# Patient Record
Sex: FEMALE | Race: WHITE | NOT HISPANIC OR LATINO | Employment: FULL TIME | URBAN - METROPOLITAN AREA
[De-identification: names, ages, dates, MRNs, and addresses within clinical notes are randomized per-mention and may not be internally consistent; named-entity substitution may affect disease eponyms.]

---

## 2023-12-28 ENCOUNTER — APPOINTMENT (OUTPATIENT)
Dept: OPHTHALMOLOGY | Facility: CLINIC | Age: 58
End: 2023-12-28
Payer: COMMERCIAL

## 2024-01-08 NOTE — PROGRESS NOTES
Assessment and Plan    Lab Results   Component Value Date/Time    SEDRATE <1 02/15/2018 1606      This 58 year-old woman with a history of glaucoma suspect, hypothyroidism, IBS, diverticulosis presents for evaluation of nystagmus.    She has nystagmus on my examination, but appears asymptomatic. She also has a small eso deviation that could reflect mild cranial nerve (CN) VI palsy or palsies, but also has sagging eye in the differential diagnosis. She does have hearing loss. My broader differential diagnosis includes brainstem lesions broadly including inflammatory, demyelinating and mass etiologies. I also consider nutritional causes possible although without a clearly concerning history outside IBS & diverticulosis. Paraneoplastic disease seems unlikely given long duration of months without sequelae, but will be a consideration if testing does not reveal a cause.    She also is a glaucoma suspect based on cup to disc ratio and borderline intraocular pressure elevation. I recommend glaucoma consultation. Imaging and laboratory testing will be helpful in considering usual differential diagnosis.    Plan    Check MRI IAC with contrast.  Check magnesium, vitamin B1, B12 & folate. Also check ACE, syphilis & tuberculosis.  Referral to Dr. Aasef Shaikh.    Follow up in 3-4 weeks with stereo plates, HVF & OCT. (Dilated 1/9/2024)

## 2024-01-09 ENCOUNTER — LAB (OUTPATIENT)
Dept: LAB | Facility: LAB | Age: 59
End: 2024-01-09
Payer: COMMERCIAL

## 2024-01-09 ENCOUNTER — OFFICE VISIT (OUTPATIENT)
Dept: OPHTHALMOLOGY | Facility: CLINIC | Age: 59
End: 2024-01-09
Payer: COMMERCIAL

## 2024-01-09 DIAGNOSIS — H55.00 NYSTAGMUS: ICD-10-CM

## 2024-01-09 DIAGNOSIS — H40.003 GLAUCOMA SUSPECT OF BOTH EYES: ICD-10-CM

## 2024-01-09 DIAGNOSIS — H55.00 NYSTAGMUS: Primary | ICD-10-CM

## 2024-01-09 LAB
CREAT SERPL-MCNC: 0.86 MG/DL (ref 0.5–1.05)
EGFRCR SERPLBLD CKD-EPI 2021: 78 ML/MIN/1.73M*2
FOLATE SERPL-MCNC: >24 NG/ML
MAGNESIUM SERPL-MCNC: 2.32 MG/DL (ref 1.6–2.4)
T PALLIDUM AB SER QL: NONREACTIVE
VIT B12 SERPL-MCNC: 975 PG/ML (ref 211–911)

## 2024-01-09 PROCEDURE — 36415 COLL VENOUS BLD VENIPUNCTURE: CPT

## 2024-01-09 PROCEDURE — 84425 ASSAY OF VITAMIN B-1: CPT

## 2024-01-09 PROCEDURE — 99205 OFFICE O/P NEW HI 60 MIN: CPT | Performed by: PSYCHIATRY & NEUROLOGY

## 2024-01-09 PROCEDURE — 86780 TREPONEMA PALLIDUM: CPT

## 2024-01-09 PROCEDURE — 82607 VITAMIN B-12: CPT

## 2024-01-09 PROCEDURE — 83735 ASSAY OF MAGNESIUM: CPT

## 2024-01-09 PROCEDURE — 82746 ASSAY OF FOLIC ACID SERUM: CPT

## 2024-01-09 PROCEDURE — 92060 SENSORIMOTOR EXAMINATION: CPT | Performed by: PSYCHIATRY & NEUROLOGY

## 2024-01-09 PROCEDURE — 82164 ANGIOTENSIN I ENZYME TEST: CPT

## 2024-01-09 PROCEDURE — 82565 ASSAY OF CREATININE: CPT

## 2024-01-09 PROCEDURE — 86481 TB AG RESPONSE T-CELL SUSP: CPT

## 2024-01-09 PROCEDURE — 86618 LYME DISEASE ANTIBODY: CPT

## 2024-01-09 RX ORDER — LEVOTHYROXINE SODIUM 75 UG/1
75 TABLET ORAL DAILY
COMMUNITY

## 2024-01-09 RX ORDER — LIOTHYRONINE SODIUM 25 UG/1
25 TABLET ORAL DAILY
COMMUNITY

## 2024-01-09 ASSESSMENT — ENCOUNTER SYMPTOMS
MUSCULOSKELETAL NEGATIVE: 0
GASTROINTESTINAL NEGATIVE: 0
CONSTITUTIONAL NEGATIVE: 0
PSYCHIATRIC NEGATIVE: 0
RESPIRATORY NEGATIVE: 0
ENDOCRINE NEGATIVE: 0
HEMATOLOGIC/LYMPHATIC NEGATIVE: 0
NEUROLOGICAL NEGATIVE: 0
ALLERGIC/IMMUNOLOGIC NEGATIVE: 0
CARDIOVASCULAR NEGATIVE: 0
EYES NEGATIVE: 1

## 2024-01-09 ASSESSMENT — CONF VISUAL FIELD
OS_SUPERIOR_NASAL_RESTRICTION: 0
OS_SUPERIOR_TEMPORAL_RESTRICTION: 0
OD_INFERIOR_TEMPORAL_RESTRICTION: 0
OS_INFERIOR_NASAL_RESTRICTION: 0
OD_NORMAL: 1
OD_INFERIOR_NASAL_RESTRICTION: 0
OD_SUPERIOR_TEMPORAL_RESTRICTION: 0
OS_INFERIOR_TEMPORAL_RESTRICTION: 0
OS_NORMAL: 1
OD_SUPERIOR_NASAL_RESTRICTION: 0

## 2024-01-09 ASSESSMENT — SLIT LAMP EXAM - LIDS
COMMENTS: NORMAL
COMMENTS: NORMAL

## 2024-01-09 ASSESSMENT — CUP TO DISC RATIO
OD_RATIO: 0.6
OS_RATIO: 0.5

## 2024-01-09 ASSESSMENT — EXTERNAL EXAM - RIGHT EYE: OD_EXAM: NORMAL

## 2024-01-09 ASSESSMENT — TONOMETRY
OD_IOP_MMHG: 19
OS_IOP_MMHG: 20
IOP_METHOD: TONOPEN

## 2024-01-09 ASSESSMENT — VISUAL ACUITY
METHOD: SNELLEN - LINEAR
OS_CC: 20/20-2
OD_CC: 20/20-2

## 2024-01-09 ASSESSMENT — EXTERNAL EXAM - LEFT EYE: OS_EXAM: NORMAL

## 2024-01-09 NOTE — LETTER
January 9, 2024     Esvin Tarango    Patient: Shannan Agustin   YOB: 1965   Date of Visit: 1/9/2024     Dear Dr. Esvin Tarango:    I am writing to share my findings regarding our shared patient Shannan Agustin from her visit with me on 1/9/2024.    HPI    This 58 year-old woman with a history of glaucoma suspect, hypothyroidism, IBS, diverticulosis presents for evaluation of nystagmus.    She saw optometrist Dr. Esvin Tarango with referral to me for torsional nystagmus and visual field loss. Dr. Tarango wrote a letter to me 7/25/2023. She started noticing in October 2023 flashes in her peripheral vision sometimes at night in the left eye. She saw Dr. Tarango again then, and she reports no tears or other problems were found.    She denies oscillopsia and other vision problems. She denies much illness other than a viral infection in November. She otherwise denies other focal neurological weakness and denies dizziness and imbalance.    She denies any diet or toxin concerns. She saw a functional medicine doctor at [facility] who recommended lower plastic exposure out of concern for a toxicity.  Last edited by Durga Prince MD PhD on 1/9/2024  8:55 AM.        Diagnoses    Shannan was seen today for new patient visit.  Diagnoses and all orders for this visit:  Nystagmus (Primary)  Glaucoma suspect of both eyes    Assessment and Plan    Lab Results   Component Value Date/Time    SEDRATE <1 02/15/2018 1606      This 58 year-old woman with a history of glaucoma suspect, hypothyroidism, IBS, diverticulosis presents for evaluation of nystagmus.    She has nystagmus on my examination, but appears asymptomatic. She also has a small eso deviation that could reflect mild cranial nerve (CN) VI palsy or palsies, but also has sagging eye in the differential diagnosis. She does have hearing loss. My broader differential diagnosis includes brainstem lesions broadly including inflammatory, demyelinating and mass  etiologies. I also consider nutritional causes possible although without a clearly concerning history outside IBS & diverticulosis. Paraneoplastic disease seems unlikely given long duration of months without sequelae, but will be a consideration if testing does not reveal a cause.    She also is a glaucoma suspect based on cup to disc ratio and borderline intraocular pressure elevation. I recommend glaucoma consultation. Imaging and laboratory testing will be helpful in considering usual differential diagnosis.    Plan    Check MRI IAC with contrast.  Check magnesium, vitamin B1, B12 & folate. Also check ACE, syphilis & tuberculosis.  Referral to Dr. Aasef Shaikh.    Follow up in 3-4 weeks with stereo plates, HVF & OCT. (Dilated 1/9/2024)      Below you will find my full examination. I appreciate the opportunity to see Shannan Agustin today and to share in her care with you. Please contact me if you have questions for me regarding this visit or if I can be of assistance to another of your patients with neuro-ophthalmological problems.    Sincerely,    Durga Prince MD PhD    CC:   No Recipients      Base Eye Exam       Visual Acuity (Snellen - Linear)         Right Left    Dist cc 20/20-2 20/20-2              Tonometry (Tonopen, 8:45 AM)         Right Left    Pressure 19 20              Pupils         Dark Light Shape React APD    Right 5 3 Round Brisk None    Left 5 3 Round Brisk None              Visual Fields         Left Right     Full Full              Extraocular Movement         Right Left     Full, Nystagmus Full, Nystagmus   Left torsional nystagmus, present in primary gaze and more evident in bilateral gazes. Faintly visible in superior gaze.             Neuro/Psych       Oriented x3: Yes              Dilation       Both eyes: 1% Mydriacyl & 2.5% Hawk  @ 8:50 AM                  Additional Tests       Color         Right Left    Ishihara 11 11              Stereo       Fly: +    Animals: 3    Circles: 9                   Additional Notes    No pronator drift and normal finger to nose test.       Cranial Nerves       CN V         Right Left    Overall Normal Normal              CN IX-X         Right Left    Overall Normal Normal              CN VII         Right Left    Overall Normal Normal              CN XI         Right Left    Overall Normal Normal              CN VIII       Hearing: reduced both ears to finger rub   Head thrust testing normal.  After head shaking no increased or new nystagmus.             CN XII         Right Left    Overall Normal Normal                  Strabismus Exam       Method: [place] distance with glasses      Distance Near Near +3DS N Bifocals                      - - - - - -  Ortho  - - - - - -                      ET 2 - -  - -  Ortho  - -  - -  ET 2                     - - - - - -  ET 2 - - - - - -                   R Tilt L Tilt                   Nystagmus: Yes           Slit Lamp and Fundus Exam       External Exam         Right Left    External Normal Normal              Slit Lamp Exam         Right Left    Lids/Lashes Normal Normal    Conjunctiva/Sclera White and quiet White and quiet    Cornea Clear Clear    Anterior Chamber Deep and quiet Deep and quiet    Iris Round and reactive Round and reactive    Lens Clear Clear    Anterior Vitreous Normal Normal              Fundus Exam         Right Left    Disc inferior notch Normal    C/D Ratio 0.6 0.5    Macula Normal Normal    Vessels Normal Normal    Periphery Normal Normal

## 2024-01-11 LAB
ACE SERPL-CCNC: 39 U/L (ref 16–85)
NIL(NEG) CONTROL SPOT COUNT: NORMAL
PANEL A SPOT COUNT: 2
PANEL B SPOT COUNT: 0
POS CONTROL SPOT COUNT: NORMAL
T-SPOT. TB INTERPRETATION: NEGATIVE

## 2024-01-12 LAB
B BURGDOR.VLSE1+PEPC10 AB SER IA-ACNC: 0.08 IV
VIT B1 PYROPHOSHATE BLD-SCNC: 140 NMOL/L (ref 70–180)

## 2024-01-25 ENCOUNTER — HOSPITAL ENCOUNTER (OUTPATIENT)
Dept: RADIOLOGY | Facility: CLINIC | Age: 59
Discharge: HOME | End: 2024-01-25
Payer: COMMERCIAL

## 2024-01-25 VITALS — HEIGHT: 64 IN | WEIGHT: 116.84 LBS | BODY MASS INDEX: 19.95 KG/M2

## 2024-01-25 DIAGNOSIS — H40.003 GLAUCOMA SUSPECT OF BOTH EYES: ICD-10-CM

## 2024-01-25 DIAGNOSIS — H55.00 NYSTAGMUS: ICD-10-CM

## 2024-01-25 PROCEDURE — A9575 INJ GADOTERATE MEGLUMI 0.1ML: HCPCS | Performed by: PSYCHIATRY & NEUROLOGY

## 2024-01-25 PROCEDURE — 70553 MRI BRAIN STEM W/O & W/DYE: CPT

## 2024-01-25 PROCEDURE — 2550000001 HC RX 255 CONTRASTS: Performed by: PSYCHIATRY & NEUROLOGY

## 2024-01-25 PROCEDURE — 70553 MRI BRAIN STEM W/O & W/DYE: CPT | Performed by: RADIOLOGY

## 2024-01-25 RX ORDER — GADOTERATE MEGLUMINE 376.9 MG/ML
0.2 INJECTION INTRAVENOUS
Status: COMPLETED | OUTPATIENT
Start: 2024-01-25 | End: 2024-01-25

## 2024-01-25 RX ADMIN — GADOTERATE MEGLUMINE 10 ML: 376.9 INJECTION INTRAVENOUS at 11:17

## 2024-02-01 ENCOUNTER — APPOINTMENT (OUTPATIENT)
Dept: OPHTHALMOLOGY | Facility: CLINIC | Age: 59
End: 2024-02-01
Payer: COMMERCIAL

## 2024-02-07 ENCOUNTER — APPOINTMENT (OUTPATIENT)
Dept: OPHTHALMOLOGY | Facility: CLINIC | Age: 59
End: 2024-02-07
Payer: COMMERCIAL

## 2024-04-16 ENCOUNTER — OFFICE VISIT (OUTPATIENT)
Dept: NEUROLOGY | Facility: CLINIC | Age: 59
End: 2024-04-16
Payer: COMMERCIAL

## 2024-04-16 VITALS
BODY MASS INDEX: 19.46 KG/M2 | HEART RATE: 78 BPM | WEIGHT: 114 LBS | HEIGHT: 64 IN | SYSTOLIC BLOOD PRESSURE: 130 MMHG | DIASTOLIC BLOOD PRESSURE: 70 MMHG

## 2024-04-16 DIAGNOSIS — H40.003 GLAUCOMA SUSPECT OF BOTH EYES: ICD-10-CM

## 2024-04-16 DIAGNOSIS — H55.00 NYSTAGMUS: ICD-10-CM

## 2024-04-16 PROCEDURE — 99204 OFFICE O/P NEW MOD 45 MIN: CPT | Performed by: PSYCHIATRY & NEUROLOGY

## 2024-04-16 PROCEDURE — 1036F TOBACCO NON-USER: CPT | Performed by: PSYCHIATRY & NEUROLOGY

## 2024-04-16 ASSESSMENT — VISUAL ACUITY: VA_NORMAL: 1

## 2024-04-16 NOTE — PROGRESS NOTES
History Of Present Illness  Shannan Agustin is a 58 y.o. female with hypothyroidism and glaucoma who presents for a new patient visit for nystagmus.     Saw her regular eye doctor for a checkup who recommended seeing a neuro ophthalmologist per pt.   Found by her neuro ophthalmologist to have nystagmus on exam around 1/2024. And said to have droopy eyelid. Has had droopy eyelid for 15 to 20 years per pt. Was concerned about her left eye per pt.     At home, has TV which she views at an angle.     She denies any symptoms including double vision, dizziness, nausea, HA.     Family hx: glaucoma in uncle but not immediately family   Social hx: works as      Past Medical and Surgical History    Past Medical History:   Diagnosis Date    Personal history of other endocrine, nutritional and metabolic disease 09/17/2014    History of hypothyroidism          Past Surgical History:   Procedure Laterality Date    DILATION AND CURETTAGE OF UTERUS  09/17/2014    Dilation And Curettage        Social History  Social History     Tobacco Use    Smoking status: Never    Smokeless tobacco: Never   Substance Use Topics    Alcohol use: Never    Drug use: Never     Allergies  Patient has no known allergies.  (Not in a hospital admission)      Review of Systems   HENT:          Nystagmus    All other systems reviewed and are negative.      Cardiovascular system: S1-S2 intact, RRR, no murmurs   Respiratory clear to auscultation bilateral on deep breathing, no wheezing   Neuro: no vision changes or neck pain on moving head up and down.     Neurological Exam  Mental Status  Awake, alert and oriented to person, place and time. Oriented to person, place, time and situation. Recent and remote memory are intact. Speech is normal. Language is fluent with no aphasia. Attention and concentration are normal. Fund of knowledge is appropriate for level of education.    Cranial Nerves  CN II: Visual acuity is normal. Visual fields full to  "confrontation. Right funduscopic exam: disc intact. Left funduscopic exam: disc intact. No nystagmus on movement of eyes b/l in all directions .  CN III, IV, VI: Extraocular movements intact bilaterally. Normal lids and orbits bilaterally. Pupils equal round and reactive to light bilaterally.  CN V: Facial sensation is normal.  CN VII: Full and symmetric facial movement.  CN VIII: Hearing is normal.  CN IX, X: Palate elevates symmetrically  CN XI: Shoulder shrug strength is normal.  CN XII: Tongue midline without atrophy or fasciculations.  Swinging light exam pupils equal bilaterally   .    Motor  Normal muscle bulk throughout. Normal muscle tone. No abnormal involuntary movements. Strength is 5/5 throughout all four extremities.    Sensory  Sensation is intact to light touch, pinprick, vibration and proprioception in all four extremities.    Reflexes  Deep tendon reflexes are 2+ and symmetric in all four extremities.    Coordination    Finger-to-nose, rapid alternating movements and heel-to-shin normal bilaterally without dysmetria.    Gait  Normal toe walking. Normal heel walking. Normal tandem gait.  Right shoulder lower than left on walking .        Last Recorded Vitals  Blood pressure 130/70, pulse 78, height 1.626 m (5' 4\"), weight 51.7 kg (114 lb).    Relevant Results      Current Outpatient Medications:     levothyroxine (Synthroid) 75 mcg tablet, Take 1 tablet (75 mcg) by mouth once daily., Disp: , Rfl:     liothyronine (Cytomel) 25 mcg tablet, Take 1 tablet (25 mcg) by mouth once daily., Disp: , Rfl:      Continuous medications    PRN medications, reviewed                                        I have personally reviewed the following imaging for brain (CT/ MR) and results and discussed in detail with the patient/care giver/family       Assessment/Plan     Assessment   #intermittent nystagmus, asymptomatic    #facial asymmetry, likely congenital   -Unclear origin of nystagmus. Reviewed MR JOHAN w w/o " contrast, no significant findings such as mass found. No nystagmus on physical exam this visit.   -suspect based on clinical picture pt has congenital facial asymmetry which can explain differences in eyelids. Shoulder height differences could be due to she way pt sits to watch TV, discussed this with pt.     Plan   -MRI of orbit w w/o contrast, rule out right retro orbital tumor or vascular malformation   -follow up with neuro ophthalmologist     Patient/Family Education: Extensive time was spent educating the patient on relevant anatomy, clinical findings and imaging, as well as discussing the potential diagnoses as discussed above.  Pharmacology: as above. Exercise: I discussed the importance of maintaining a daily exercise program, including stretching and strengthening. Preventative strategies were reviewed, specifically avoidance of any exercises that exacerbate pain.Return to online virtual visit/ clinic visit for follow-up with KEITH Fish in 2 weeks or sooner as needed.The patient expressed understanding and agreement with the assessment and plan.  Patient encouraged to contact us should they have any questions, concerns, or any changes in symptoms. Thank you for allowing me to participate in the care of your patient.** This note is created using speech recognition transcription software. Despite proofreading, several typographical errors might be present that might affect the meaning of the content. Please call with any questions.**    Discussed with Dr. Balta Herrera, DO PGY-2 BEAR Herrera DO

## 2024-04-17 NOTE — PROGRESS NOTES
Addendum by Balta Steve MD  Neurology    I saw and took history, examined, reviewed labs, and reviewed images of Brain/MRA/ MRI Spine. I evaluated the patient. I obtained the key and critical portions of the history and physical exam. I reviewed the resident's documentation and discussed the patient with the resident. I agree with the resident's medical decision-making as documented in the resident's note.

## 2024-05-03 ENCOUNTER — TRANSCRIBE ORDERS (OUTPATIENT)
Dept: OBSTETRICS AND GYNECOLOGY | Facility: CLINIC | Age: 59
End: 2024-05-03
Payer: COMMERCIAL

## 2024-05-03 DIAGNOSIS — Z12.31 BREAST CANCER SCREENING BY MAMMOGRAM: Primary | ICD-10-CM

## 2024-07-16 ENCOUNTER — APPOINTMENT (OUTPATIENT)
Dept: OBSTETRICS AND GYNECOLOGY | Facility: CLINIC | Age: 59
End: 2024-07-16
Payer: COMMERCIAL

## 2024-07-16 ENCOUNTER — HOSPITAL ENCOUNTER (OUTPATIENT)
Dept: RADIOLOGY | Facility: CLINIC | Age: 59
Discharge: HOME | End: 2024-07-16
Payer: COMMERCIAL

## 2024-07-16 VITALS
HEIGHT: 64 IN | BODY MASS INDEX: 19.63 KG/M2 | DIASTOLIC BLOOD PRESSURE: 78 MMHG | WEIGHT: 115 LBS | SYSTOLIC BLOOD PRESSURE: 122 MMHG

## 2024-07-16 VITALS — WEIGHT: 113.98 LBS | HEIGHT: 64 IN | BODY MASS INDEX: 19.46 KG/M2

## 2024-07-16 DIAGNOSIS — Z01.419 WELL WOMAN EXAM: Primary | ICD-10-CM

## 2024-07-16 DIAGNOSIS — Z12.31 BREAST CANCER SCREENING BY MAMMOGRAM: ICD-10-CM

## 2024-07-16 DIAGNOSIS — Z12.4 CERVICAL CANCER SCREENING: ICD-10-CM

## 2024-07-16 PROCEDURE — 1036F TOBACCO NON-USER: CPT | Performed by: OBSTETRICS & GYNECOLOGY

## 2024-07-16 PROCEDURE — 77063 BREAST TOMOSYNTHESIS BI: CPT

## 2024-07-16 PROCEDURE — 87624 HPV HI-RISK TYP POOLED RSLT: CPT

## 2024-07-16 PROCEDURE — 77063 BREAST TOMOSYNTHESIS BI: CPT | Performed by: RADIOLOGY

## 2024-07-16 PROCEDURE — 77067 SCR MAMMO BI INCL CAD: CPT | Performed by: RADIOLOGY

## 2024-07-16 PROCEDURE — 99396 PREV VISIT EST AGE 40-64: CPT | Performed by: OBSTETRICS & GYNECOLOGY

## 2024-07-16 ASSESSMENT — ENCOUNTER SYMPTOMS
SHORTNESS OF BREATH: 0
BACK PAIN: 0
COLOR CHANGE: 0
ABDOMINAL DISTENTION: 0
FLANK PAIN: 0
FEVER: 0
ABDOMINAL PAIN: 0
BLOOD IN STOOL: 0
DIARRHEA: 0
APPETITE CHANGE: 0
FREQUENCY: 0
FATIGUE: 0
HEMATURIA: 0
NAUSEA: 0
VOMITING: 0
DYSURIA: 0
CHILLS: 0
CONSTIPATION: 0
UNEXPECTED WEIGHT CHANGE: 0
SLEEP DISTURBANCE: 0

## 2024-07-16 ASSESSMENT — PAIN SCALES - GENERAL: PAINLEVEL: 0-NO PAIN

## 2024-07-16 NOTE — PROGRESS NOTES
"History Of Present Illness  Routine Gyn Exam  Shannan Agustin here for routine WWE.  Pt is postmenopausal.  Denies spotting or bleeding.     Concerns: none.     New health issues or surgeries since last visit: denies.  Exercise: walking.     Gynecologic History  Postmenopausal.  Sexually active: Yes with one male partner/ .  Last Pap: .   Last mammogram: .   Last Colonoscopy:  up to date per patient .       Obstetric History  OB History    Para Term  AB Living   2 1 1   1 1   SAB IAB Ectopic Multiple Live Births   1       1      # Outcome Date GA Lbr Silvestre/2nd Weight Sex Type Anes PTL Lv   2 Term     M Vag-Spont  N TASHA   1 SAB                 Review of Systems   Constitutional:  Negative for appetite change, chills, fatigue, fever and unexpected weight change.   Respiratory:  Negative for shortness of breath.    Cardiovascular:  Negative for chest pain.   Gastrointestinal:  Negative for abdominal distention, abdominal pain, blood in stool, constipation, diarrhea, nausea and vomiting.   Endocrine: Negative for cold intolerance and heat intolerance.   Genitourinary:  Negative for dyspareunia, dysuria, flank pain, frequency, genital sores, hematuria, menstrual problem, pelvic pain, urgency, vaginal bleeding, vaginal discharge and vaginal pain.   Musculoskeletal:  Negative for back pain.   Skin:  Negative for color change.   Psychiatric/Behavioral:  Negative for sleep disturbance.        /78 (BP Location: Left arm, Patient Position: Sitting)   Ht 1.626 m (5' 4\")   Wt 52.2 kg (115 lb)   BMI 19.74 kg/m²      Physical Exam  Constitutional:       Appearance: Normal appearance.   HENT:      Head: Normocephalic and atraumatic.   Chest:   Breasts:     Right: Normal.      Left: Normal.   Abdominal:      General: Abdomen is flat.      Palpations: Abdomen is soft.      Tenderness: There is no abdominal tenderness.   Genitourinary:     General: Normal vulva.      Vagina: Normal.      Cervix: " Normal.      Uterus: Normal.       Adnexa: Right adnexa normal and left adnexa normal.      Comments: Pap collected today    Skin:     General: Skin is warm and dry.   Neurological:      Mental Status: She is alert and oriented to person, place, and time.   Psychiatric:         Mood and Affect: Mood normal.              Assessment/Plan         Routine Well Woman Exam Today  Discussed diet and exercise.   Reviewed routine health screenings.   Pap: pap done today   Recommend annual mammograms. Mammogram done this am.   Currently up to date on colon cancer screening.                  Karen Sultana MD

## 2024-07-30 LAB
CYTOLOGY CMNT CVX/VAG CYTO-IMP: NORMAL
HPV HR 12 DNA GENITAL QL NAA+PROBE: NEGATIVE
HPV HR GENOTYPES PNL CVX NAA+PROBE: NEGATIVE
HPV16 DNA SPEC QL NAA+PROBE: NEGATIVE
HPV18 DNA SPEC QL NAA+PROBE: NEGATIVE
LAB AP HPV GENOTYPE QUESTION: YES
LAB AP HPV HR: NORMAL
LABORATORY COMMENT REPORT: NORMAL
PATH REPORT.TOTAL CANCER: NORMAL